# Patient Record
Sex: MALE | Race: WHITE | URBAN - METROPOLITAN AREA
[De-identification: names, ages, dates, MRNs, and addresses within clinical notes are randomized per-mention and may not be internally consistent; named-entity substitution may affect disease eponyms.]

---

## 2017-02-02 ENCOUNTER — OFFICE VISIT (OUTPATIENT)
Dept: INTERNAL MEDICINE | Facility: CLINIC | Age: 22
End: 2017-02-02

## 2017-02-02 VITALS
SYSTOLIC BLOOD PRESSURE: 121 MMHG | WEIGHT: 156.7 LBS | HEIGHT: 71 IN | DIASTOLIC BLOOD PRESSURE: 70 MMHG | HEART RATE: 72 BPM | BODY MASS INDEX: 21.94 KG/M2

## 2017-02-02 DIAGNOSIS — Z23 ENCOUNTER FOR IMMUNIZATION: Primary | ICD-10-CM

## 2017-02-02 ASSESSMENT — ENCOUNTER SYMPTOMS
INCREASED ENERGY: 0
DYSURIA: 0
BOWEL INCONTINENCE: 0
SWOLLEN GLANDS: 0
SINUS PAIN: 0
FEVER: 0
DYSPNEA ON EXERTION: 0
TINGLING: 0
POSTURAL DYSPNEA: 0
TASTE DISTURBANCE: 0
NERVOUS/ANXIOUS: 0
FATIGUE: 0
DECREASED CONCENTRATION: 0
HEADACHES: 0
SMELL DISTURBANCE: 0
NAIL CHANGES: 0
LEG SWELLING: 0
ARTHRALGIAS: 0
ALTERED TEMPERATURE REGULATION: 0
SLEEP DISTURBANCES DUE TO BREATHING: 0
FLANK PAIN: 0
TACHYCARDIA: 0
HALLUCINATIONS: 0
INSOMNIA: 0
NIGHT SWEATS: 0
POOR WOUND HEALING: 0
DIZZINESS: 0
JAUNDICE: 0
EXERCISE INTOLERANCE: 0
DIARRHEA: 0
TROUBLE SWALLOWING: 0
RECTAL BLEEDING: 0
HYPERTENSION: 0
PARALYSIS: 0
MEMORY LOSS: 0
NUMBNESS: 0
LEG PAIN: 0
PANIC: 0
POLYDIPSIA: 0
ORTHOPNEA: 0
WEAKNESS: 0
BRUISES/BLEEDS EASILY: 0
SYNCOPE: 0
EYE REDNESS: 0
ABDOMINAL PAIN: 0
WEIGHT GAIN: 0
SPUTUM PRODUCTION: 0
DISTURBANCES IN COORDINATION: 0
EYE WATERING: 0
JOINT SWELLING: 0
NECK PAIN: 0
DEPRESSION: 0
DECREASED APPETITE: 0
RESPIRATORY PAIN: 0
RECTAL PAIN: 0
SNORES LOUDLY: 0
PALPITATIONS: 0
EYE PAIN: 0
TREMORS: 0
MYALGIAS: 0
LOSS OF CONSCIOUSNESS: 0
SORE THROAT: 0
SKIN CHANGES: 0
STIFFNESS: 0
HYPOTENSION: 0
LIGHT-HEADEDNESS: 0
HEMATURIA: 0
SINUS CONGESTION: 0
SPEECH CHANGE: 0
WEIGHT LOSS: 0
COUGH: 0
VOMITING: 0
NAUSEA: 0
BLOOD IN STOOL: 0
POLYPHAGIA: 0
SEIZURES: 0
EXTREMITY NUMBNESS: 0
BACK PAIN: 0
CHILLS: 0
BLOATING: 0
DOUBLE VISION: 0
HEARTBURN: 0
WHEEZING: 0
CLAUDICATION: 0
SHORTNESS OF BREATH: 0
MUSCLE CRAMPS: 0
COUGH DISTURBING SLEEP: 0
MUSCLE WEAKNESS: 0
CONSTIPATION: 0
DIFFICULTY URINATING: 0
HEMOPTYSIS: 0
NECK MASS: 0
HOARSE VOICE: 0
EYE IRRITATION: 0

## 2017-02-02 ASSESSMENT — ACTIVITIES OF DAILY LIVING (ADL)
DO_MEMBERS_OF_YOUR_HOUSEHOLD_USE_SAFETY_HELMETS?: Y
ARE_THERE_CARBON_MONOXIDE_DETECTORS_IN_YOUR_HOME?: Y
ARE_THERE_FIREARMS_IN_YOUR_HOME?: N
ARE_THERE_SMOKE_DETECTORS_IN_YOUR_HOME?: Y
DO_MEMBERS_OF_YOUR_HOUSEHOLD_USE_SUNSCREEN?: Y
DO_MEMBERS_OF_YOUR_HOUSEHOLD_WEAR_SEAT_BELTS?: Y

## 2017-02-02 ASSESSMENT — PAIN SCALES - GENERAL: PAINLEVEL: NO PAIN (0)

## 2017-02-02 NOTE — PATIENT INSTRUCTIONS
[facility] Medication Refill Request Information:  * Please contact your pharmacy regarding ANY request for medication refills.  ** [facility] Prescription Fax = [phone]  * Please allow 3 business days for routine medication refills.  * Please allow 5 business days for controlled substance medication refills.     [facility] Test Result notification information:  *You will be notified with in 7-10 days of your appointment day regarding the results of your test.  If you are on MyChart you will be notified as soon as the provider has reviewed the results and signed off on them.      [facility] [phone] (4th Floor [facility] [employer] [facility] [employer])

## 2017-02-02 NOTE — NURSING NOTE
Immunization(s) was given, tolerated well. See immunizations for more details. Darby Haney LPN at 8:35 AM on 2/2/2017.

## 2017-02-02 NOTE — NURSING NOTE
Chief Complaint   Patient presents with     Establish Care     pt here to establish care     Imm/Inj     pt would like to discuss vaccinations     Kamilah Yanes CMA at 7:57 AM on 2/2/2017.

## 2017-02-02 NOTE — PROGRESS NOTES
Attestation:  I, Cecilia Alvarado, saw this patient with the resident and agree with the resident s findings and plan of care as documented in the resident s note.      Cecilia Alvarado MD

## 2017-02-02 NOTE — PROGRESS NOTES
[facility] Office Visit  Date: February 2, 2017  Resident: Pillo Reyes MD  Attending: Dr. Rodriguez    SUBJECTIVE:  Adam Shafer is a 21 year old male with no past medical history comes in to receive second dose of Meningitis B vaccine and to establish care. All vaccinations and healthcare maintenance are up to date. He received Meningitis B vaccine last month, when his mother was concerned of meningitis outbreak in his hometown [place] [facility].  He has no symptoms complaint.     Health maintenance  Tetanus/pertussis vaccination status reviewed: last tetanus booster within 10 years.    Cancer screening: not indicated   Exercise: active    No past surgical history on file.     Medications:  No current outpatient prescriptions on file.       No family history on file.    Social History   Substance Use Topics     Smoking status: Never Smoker      Smokeless tobacco:      Alcohol Use: One drink per week     Social History     Social History Narrative     Sexually active, wears protection all the time       Review of Systems     Constitutional:  Negative for fever, chills, weight loss, weight gain, fatigue, decreased appetite, night sweats, recent stressors, height gain, height loss, post-operative complications, incisional pain, hallucinations, increased energy, hyperactivity and confused.   HENT:  Negative for ear pain, hearing loss, tinnitus, nosebleeds, trouble swallowing, hoarse voice, mouth sores, sore throat, ear discharge, tooth pain, gum tenderness, taste disturbance, smell disturbance, hearing aid, bleeding gums, dry mouth, sinus pain, sinus congestion and neck mass.    Eyes:  Negative for double vision, pain, redness, eye pain, decreased vision, eye watering, eye bulging, eye dryness, flashing lights, spots, floaters, strabismus, tunnel vision, jaundice and eye irritation.   Respiratory:   Negative for cough, hemoptysis, sputum production, shortness of breath,  wheezing, sleep disturbances due to breathing, snores loudly, respiratory pain, dyspnea on exertion, cough disturbing sleep and postural dyspnea.    Cardiovascular:  Negative for chest pain, dyspnea on exertion, palpitations, orthopnea, claudication, leg swelling, fingers/toes turn blue, hypertension, hypotension, syncope, history of heart murmur, chest pain on exertion, chest pain at rest, pacemaker, few scattered varicosities, leg pain, sleep disturbances due to breathing, tachycardia, light-headedness, exercise intolerance and edema.   Gastrointestinal:  Negative for heartburn, nausea, vomiting, abdominal pain, diarrhea, constipation, blood in stool, melena, rectal pain, bloating, hemorrhoids, bowel incontinence, jaundice, rectal bleeding, coffee ground emesis and change in stool.   Genitourinary:  Negative for bladder incontinence, dysuria, urgency, hematuria, flank pain, difficulty urinating, nocturia, voiding less frequently, scrotal pain, ulcerations, penile discharge, male genitourinary complaint and reduced libido.   Musculoskeletal:  Negative for myalgias, back pain, joint swelling, arthralgias, stiffness, muscle cramps, neck pain, bone pain, muscle weakness and fracture.   Skin:  Negative for nail changes, itching, poor wound healing, rash, hair changes, skin changes, acne, warts, poor wound healing, scarring, flaky skin, Raynaud's phenomenon, sensitivity to sunlight and skin thickening.   Neurological:  Negative for dizziness, tingling, tremors, speech change, seizures, loss of consciousness, weakness, light-headedness, numbness, headaches, disturbances in coordination, extremity numbness, memory loss, difficulty walking and paralysis.   Endo/Heme:  Negative for anemia, swollen glands and bruises/bleeds easily.   Psychiatric/Behavioral:  Negative for depression, hallucinations, memory loss, decreased concentration, mood swings and panic attacks.    Endocrine:  Negative for altered temperature regulation,  "polyphagia, polydipsia, unwanted hair growth and change in facial hair.    OBJECTIVE:  /70 mmHg  Pulse 72  Ht 1.803 m (5' 11\")  Wt 71.079 kg (156 lb 11.2 oz)  BMI 21.87 kg/m2  Body mass index is 21.87 kg/(m^2).   Gen: Well-developed, well-nourished and in no apparent distress  HEENT:  Normocephalic, atraumatic, PERRL, no scleral icterus, TM  visualized bilaterally without effusion/erythema, external auditory canals clear, no nasal discharge, oral cavity without ulcers or tonsillar exhudates.  Cranial nerves grossly intact.  Neck: supple, no LAD, no thyromegaly  CV: regular rate and rhythm, normal S1 S2, no S3 or S4 and no murmur, click, or rub  Resp: clear to ausculation bilaterally, normal respiratory effort  Abd: bowel sounds present, soft NT/ND,  no masses or hepatosplenomegaly  Ext: WWP.  no edema.    Skin: warm and dry  Psych: normal mood/affect, appropriately oriented  Neuro: AAOX3, cooperative, cranial nerves II-XII intact        ASSESSMENT/PLAN:  Encounter for immunization  -     MENINGOCOCCAL RP W/OMV VACCINE 2 DOSE IM (BEXSERO )    Return to clinic in 12 month(s)    This patient was seen and staffed with my attending, Dr. Rodriguez, who agrees with my assessment and plan.     Pillo Reyes MD  Internal Medicine, PGY-1  Pager [phone]                       "

## 2017-03-16 ENCOUNTER — OFFICE VISIT (OUTPATIENT)
Dept: INTERNAL MEDICINE | Facility: CLINIC | Age: 22
End: 2017-03-16

## 2017-03-16 VITALS
BODY MASS INDEX: 21.94 KG/M2 | DIASTOLIC BLOOD PRESSURE: 70 MMHG | HEART RATE: 80 BPM | WEIGHT: 157.3 LBS | SYSTOLIC BLOOD PRESSURE: 115 MMHG

## 2017-03-16 DIAGNOSIS — L73.9 FOLLICULITIS: Primary | ICD-10-CM

## 2017-03-16 ASSESSMENT — PAIN SCALES - GENERAL: PAINLEVEL: NO PAIN (0)

## 2017-03-16 NOTE — PROGRESS NOTES
[facility] Office Visit  Date: March 16, 2017  Resident: Pillo Reyes MD  Attending: Dr. Dow    SUBJECTIVE:  Adam Shafer is a 21 year old male with no significant past medical history who comes in for evaluation of a lump in the right side of his scrotum. States this has been present since December 2016. It has not grown in size and its not tender, red or warm. It is an external lesion. He denies fever, chills, nightsweats, headache, abdominal pain, nausea, vomiting, diarrhea, dyschezia, hematochezia/melena, dyschezia, dysuria, hematuria, change in frequency. Does not notice lymphadenopathy around his inguinal area. No penile lesions. He is sexually active and reports consistent condom use with partner. Denies history of STD. He does not smoke, drink or uses illicit drugs.       Health maintenance  Tetanus/pertussis vaccination status reviewed: last tetanus booster within 10 years.        No past surgical history.    Medications:  No current outpatient prescriptions on file.       No family history on file.    Social History   Substance Use Topics     Smoking status: Never Smoker     Smokeless tobacco: Not on file     Alcohol use Not on file     Social History     Social History Narrative     No narrative on file     ROS   Complete review of systems negative unless otherwise specified in HPI.      OBJECTIVE:  /70  Pulse 80  Wt 71.4 kg (157 lb 4.8 oz)  BMI 21.94 kg/m2  Body mass index is 21.94 kg/(m^2).   Gen: Well-developed, well-nourished and in no apparent distress  HEENT:  Normocephalic, no adenopathy  Neck: no adenopathy  CV: regular rate and rhythm, normal S1 S2 and no murmur, click, or rub  Resp: clear to ausculation bilaterally, normal respiratory effort  Abd: bowel sounds +, soft NT/ND,  no masses or hepatosplenomegaly  Ext: WWP.  no edema.    : <0.5cm round lesion on his scrotum, not erythematous, not warm, not painful. No evidence of bleeding or  suppuration. Descended testicles, no masses, no inguinal lymphadenopathy. No penile lesions.  Skin: warm and dry  Psych: normal mood/affect, appropriately oriented  Neuro: AAOX3, cooperative, cranial nerves II-XII intact    ASSESSMENT/PLAN:  #Scrotal folliculitis  Patient without fever, chills. Area has not grown since first noticed. On exam no evidence of bleed, suppuration, warmth or erythema. No inguinal lymphadenopathy.  No penile lesions.   - Recommended warm compressions  - RTC if lesion grows, becomes tender or erythematous       Return to clinic in as needed    This patient was seen and staffed with my attending, Dr. Dow, who agrees with my assessment and plan.     Pillo Reyes MD  Internal Medicine, PGY-1  Pager [phone]      Pt was seen and examined with Dr. Reyes; confirmed skin findings on exam;  I agree with the A/P as documented above    Malorie Dow MD

## 2017-03-16 NOTE — NURSING NOTE
Chief Complaint   Patient presents with     Derm Problem     Patient here for a evelyn on penis     Darby Haney LPN at 10:00 AM on 3/16/2017.

## 2018-11-20 ENCOUNTER — HEALTH MAINTENANCE LETTER (OUTPATIENT)
Age: 23
End: 2018-11-20

## 2019-02-14 NOTE — MR AVS SNAPSHOT
After Visit Summary   3/16/2017    Adam Shafer    MRN: [id number]           Patient Information     Date Of Birth          1995        Visit Information        Provider Department      3/16/2017 10:25 AM Pillo Louis MD [facility]        Today's Diagnoses     Folliculitis    -  1       Follow-ups after your visit        Follow-up notes from your care team     Return if symptoms worsen or fail to improve.      Who to contact     Please call your clinic at [phone] to:    Ask questions about your health    Make or cancel appointments    Discuss your medicines    Learn about your test results    Speak to your doctor   If you have compliments or concerns about an experience at your clinic, or if you wish to file a complaint, please contact [facility] Patient Relations at [phone] or email us at [email or web address]         Additional Information About Your Visit        MyChart Information     [employer] is an electronic gateway that provides easy, online access to your medical records. With [employer], you can request a clinic appointment, read your test results, renew a prescription or communicate with your care team.     To sign up for [employer] visit the website at [email or web address]   You will be asked to enter the access code listed below, as well as some personal information. Please follow the directions to create your username and password.     Your access code is: [email or web address]  Expires: 2017  7:30 AM     Your access code will  in 90 days. If you need help or a new code, please contact your [facility] or call [phone] for assistance.        Care EveryWhere ID     This is your Care EveryWhere ID. This could be used by other organizations to access your [place] medical records  [id number]        Your Vitals Were     Pulse BMI (Body Mass Index)                80 21.94 kg/m2         - Discussed importance of diet for management of weight  Encouraged both aerobic and resistance exercise 150 minutes a week    - Pt will fill out record release for prior PCP  - CBC, CMP, TSH, Lipid panel  - Return in 4 weeks to discuss labs and review prior records    Blood Pressure from Last 3 Encounters:   03/16/17 115/70   02/02/17 121/70    Weight from Last 3 Encounters:   03/16/17 71.4 kg (157 lb 4.8 oz) (54 %)*   02/02/17 71.1 kg (156 lb 11.2 oz) (53 %)*     * Growth percentiles are based on [facility] 2-20 Years data.              Today, you had the following     No orders found for display       Primary Care Provider Office Phone # Fax #    Pillo Louis [phone]       [address]  [facility] [address]        Thank you!     Thank you for choosing [facility]  for your care. Our goal is always to provide you with excellent care. Hearing back from our patients is one way we can continue to improve our services. Please take a few minutes to complete the written survey that you may receive in the mail after your visit with us. Thank you!             Your Updated Medication List - Protect others around you: Learn how to safely use, store and throw away your medicines at www.disposemymeds.org.      Notice  As of 3/16/2017 11:59 PM    You have not been prescribed any medications.

## 2024-08-05 NOTE — MR AVS SNAPSHOT
After Visit Summary   2/2/2017    Adam Shafer    MRN: [id number]           Patient Information     Date Of Birth          1995        Visit Information        Provider Department      2/2/2017 8:25 AM Pillo Reyes MD [facility]        Today's Diagnoses     Encounter for immunization    -  1       Care Instructions    [facility] Center Medication Refill Request Information:  * Please contact your pharmacy regarding ANY request for medication refills.  ** [facility] Prescription Fax = [phone]  * Please allow 3 business days for routine medication refills.  * Please allow 5 business days for controlled substance medication refills.     [facility] Test Result notification information:  *You will be notified with in 7-10 days of your appointment day regarding the results of your test.  If you are on MyChart you will be notified as soon as the provider has reviewed the results and signed off on them.      Primary Care Center [phone] (4th Floor [facility] Building)           Follow-ups after your visit        Follow-up notes from your care team     Return in about 1 year (around 2/2/2018).      Who to contact     Please call your clinic at [phone] to:    Ask questions about your health    Make or cancel appointments    Discuss your medicines    Learn about your test results    Speak to your doctor   If you have compliments or concerns about an experience at your clinic, or if you wish to file a complaint, please contact [facility] Patient Relations at [phone] or email us at [email or web address]         Additional Information About Your Visit        MyChart Information     [employer] is an electronic gateway that provides easy, online access to your medical records. With [employer], you can request a clinic appointment, read your test results, renew a prescription or communicate with your care team.     To sign up for  "MyChart visit the website at [email or web address]   You will be asked to enter the access code listed below, as well as some personal information. Please follow the directions to create your username and password.     Your access code is: [email or web address]  Expires: 2017  6:30 AM     Your access code will  in 90 days. If you need help or a new code, please contact your [facility] or call [phone] for assistance.        Care EveryWhere ID     This is your Care EveryWhere ID. This could be used by other organizations to access your [place] medical records  [id number]        Your Vitals Were     Pulse Height BMI (Body Mass Index)             72 1.803 m (5' 11\") 21.87 kg/m2          Blood Pressure from Last 3 Encounters:   17 121/70    Weight from Last 3 Encounters:   17 71.079 kg (156 lb 11.2 oz) (53.33 %*)     * Growth percentiles are based on CDC 2-20 Years data.              We Performed the Following     MENINGOCOCCAL RP W/OMV VACCINE 2 DOSE IM (BEXSERO )        Primary Care Provider    Pcp Unknown Verified       No address on file        Thank you!     Thank you for choosing [facility]  for your care. Our goal is always to provide you with excellent care. Hearing back from our patients is one way we can continue to improve our services. Please take a few minutes to complete the written survey that you may receive in the mail after your visit with us. Thank you!             Your Updated Medication List - Protect others around you: Learn how to safely use, store and throw away your medicines at www.disposemymeds.org.      Notice  As of 2017  8:28 AM    You have not been prescribed any medications.      " yes